# Patient Record
Sex: MALE | Race: OTHER | HISPANIC OR LATINO | URBAN - METROPOLITAN AREA
[De-identification: names, ages, dates, MRNs, and addresses within clinical notes are randomized per-mention and may not be internally consistent; named-entity substitution may affect disease eponyms.]

---

## 2022-11-11 ENCOUNTER — WEB ENCOUNTER (OUTPATIENT)
Dept: URBAN - METROPOLITAN AREA CLINIC 82 | Facility: CLINIC | Age: 37
End: 2022-11-11

## 2022-11-16 ENCOUNTER — DASHBOARD ENCOUNTERS (OUTPATIENT)
Age: 37
End: 2022-11-16

## 2022-11-16 ENCOUNTER — OFFICE VISIT (OUTPATIENT)
Dept: URBAN - METROPOLITAN AREA CLINIC 82 | Facility: CLINIC | Age: 37
End: 2022-11-16
Payer: COMMERCIAL

## 2022-11-16 VITALS
HEART RATE: 55 BPM | HEIGHT: 70 IN | SYSTOLIC BLOOD PRESSURE: 115 MMHG | DIASTOLIC BLOOD PRESSURE: 68 MMHG | WEIGHT: 178.8 LBS | BODY MASS INDEX: 25.6 KG/M2 | TEMPERATURE: 97.2 F

## 2022-11-16 DIAGNOSIS — R12 HEART BURN: ICD-10-CM

## 2022-11-16 DIAGNOSIS — R07.0 THROAT PAIN: ICD-10-CM

## 2022-11-16 PROBLEM — 16331000: Status: ACTIVE | Noted: 2022-11-16

## 2022-11-16 PROBLEM — 162397003: Status: ACTIVE | Noted: 2022-11-16

## 2022-11-16 PROCEDURE — 99203 OFFICE O/P NEW LOW 30 MIN: CPT | Performed by: INTERNAL MEDICINE

## 2022-11-16 NOTE — HPI-TODAY'S VISIT:
RECENT ER VISIT FOR ESOHAGEAL PAIN, STREPT TEST WAS NEG. REMOTE H/O COVID HE GOT ANTIBIOTICS AND STEROIDS FROM ER.   WIFE NAM IS HERE  THROAT BAD, WENT TO ER, HE DID TEST BEFORE ER,  2 TEST HOME TEST FIRST TIME POSITIVE COVID TEST THEN SECOND ONE IS NEGATIVE.  ER GAVE MEDICATION. HELPED. FEELING BETTER  NO PROBLEM. EAT GOOD.. NO THORAT PAIN,   COVID 19 2 MONTHS.   NO HEART BURN OR REFLUX. NO WT LOSS.   BM NORMAL, NO BLEEDING, NO ABDOMINAL PAIN.  OCC HEART BURN

## 2022-11-18 LAB
H PYLORI BREATH TEST: NOT DETECTED
H. PYLORI BREATH TEST: NOT DETECTED
INTERPRETATION: NOT DETECTED